# Patient Record
Sex: MALE | Race: BLACK OR AFRICAN AMERICAN | Employment: FULL TIME | URBAN - METROPOLITAN AREA
[De-identification: names, ages, dates, MRNs, and addresses within clinical notes are randomized per-mention and may not be internally consistent; named-entity substitution may affect disease eponyms.]

---

## 2020-10-12 ENCOUNTER — HOSPITAL ENCOUNTER (EMERGENCY)
Age: 60
Discharge: HOME | End: 2020-10-12
Attending: EMERGENCY MEDICINE
Payer: COMMERCIAL

## 2020-10-12 ENCOUNTER — APPOINTMENT (OUTPATIENT)
Dept: GENERAL RADIOLOGY | Age: 60
End: 2020-10-12
Attending: EMERGENCY MEDICINE
Payer: COMMERCIAL

## 2020-10-12 VITALS
HEIGHT: 64 IN | BODY MASS INDEX: 34.49 KG/M2 | DIASTOLIC BLOOD PRESSURE: 85 MMHG | HEART RATE: 86 BPM | TEMPERATURE: 98.2 F | OXYGEN SATURATION: 97 % | RESPIRATION RATE: 16 BRPM | WEIGHT: 202 LBS | SYSTOLIC BLOOD PRESSURE: 129 MMHG

## 2020-10-12 DIAGNOSIS — M19.011 OSTEOARTHRITIS OF RIGHT SHOULDER, UNSPECIFIED OSTEOARTHRITIS TYPE: Primary | ICD-10-CM

## 2020-10-12 PROCEDURE — 73030 X-RAY EXAM OF SHOULDER: CPT

## 2020-10-12 PROCEDURE — 99282 EMERGENCY DEPT VISIT SF MDM: CPT

## 2020-10-12 NOTE — ED TRIAGE NOTES
Pt c/o R shoulder pain that increases with movement for \"several weeks\". No known injury. +CSMT. Denies any other c/o.

## 2020-10-12 NOTE — ED PROVIDER NOTES
EMERGENCY DEPARTMENT HISTORY AND PHYSICAL EXAM      Date: 10/12/2020  Patient Name: Lizzette Carrillo    History of Presenting Illness     Chief Complaint   Patient presents with    Shoulder Pain       History Provided By: Patient    HPI: Lizzette Carrillo, 64 y.o. male with a past medical history significant for  diabetes presents to the ED with cc of right shoulder pain for 2 weeks. Denies any known injury. Worsening symptoms with movement. No chest pain shortness of breath fever cough or cold symptoms. No weakness or numbness. Took ibuprofen once without relief of pain    There are no other complaints, changes, or physical findings at this time. PCP: Rubina Arizmendi MD    No current facility-administered medications on file prior to encounter. No current outpatient medications on file prior to encounter. Past History     Past Medical History:  No past medical history on file. Past Surgical History:  No past surgical history on file. Family History:  No family history on file. Social History:  Social History     Tobacco Use    Smoking status: Not on file   Substance Use Topics    Alcohol use: Not on file    Drug use: Not on file       Allergies:  No Known Allergies      Review of Systems   Review of Systems   Constitutional: Negative for fever. Respiratory: Negative for cough. Skin: Negative for rash. Neurological: Negative for weakness and numbness. Physical Exam   Physical Exam  Vitals signs and nursing note reviewed. Constitutional:       Appearance: Normal appearance. HENT:      Head: Normocephalic and atraumatic. Eyes:      General: No scleral icterus. Extraocular Movements: Extraocular movements intact. Pupils: Pupils are equal, round, and reactive to light. Neck:      Musculoskeletal: Normal range of motion. No neck rigidity. Cardiovascular:      Rate and Rhythm: Normal rate and regular rhythm. Pulses: Normal pulses.    Pulmonary:      Effort: Pulmonary effort is normal.   Musculoskeletal:         General: No swelling or deformity. Comments: Patient points to anterior right shoulder as site of pain. Fairly good range of motion but pt states this reproduces pain. Skin:     General: Skin is warm and dry. Capillary Refill: Capillary refill takes less than 2 seconds. Findings: No bruising or rash. Neurological:      Mental Status: He is alert. Comments: Normal gross motor and sensory throughout   Psychiatric:         Mood and Affect: Mood normal.         Behavior: Behavior normal.         Diagnostic Study Results     Labs -   No results found for this or any previous visit (from the past 12 hour(s)). Radiologic Studies -   XR SHOULDER RT AP/LAT MIN 2 V   Final Result   Impression: No acute fracture or dislocation. Degenerative changes of the   glenohumeral and AC joints. CT Results  (Last 48 hours)    None        CXR Results  (Last 48 hours)    None            Medical Decision Making   I am the first provider for this patient. I reviewed the vital signs, available nursing notes, past medical history, past surgical history, family history and social history. Vital Signs-Reviewed the patient's vital signs. No data found. Records Reviewed: Nursing Notes    Provider Notes (Medical Decision Making):       ED Course:   Initial assessment performed. The patients presenting problems have been discussed, and they are in agreement with the care plan formulated and outlined with them. I have encouraged them to ask questions as they arise throughout their visit. Imp: THERESE  Discussed range of motion exercises and symptomatic treatment with over-the-counter medications and anti-inflammatories. Follow-up with PCP    PLAN:  1. There are no discharge medications for this patient.     2.   Follow-up Information     Follow up With Specialties Details Why Contact Info        Your doctor for a follow-up appointment Return to ED if worse     Diagnosis     Clinical Impression:   1.  Osteoarthritis of right shoulder, unspecified osteoarthritis type

## 2020-10-13 ENCOUNTER — HOSPITAL ENCOUNTER (EMERGENCY)
Age: 60
Discharge: HOME | End: 2020-10-13
Attending: EMERGENCY MEDICINE
Payer: COMMERCIAL

## 2020-10-13 VITALS
RESPIRATION RATE: 17 BRPM | WEIGHT: 202 LBS | SYSTOLIC BLOOD PRESSURE: 154 MMHG | OXYGEN SATURATION: 97 % | TEMPERATURE: 98.7 F | DIASTOLIC BLOOD PRESSURE: 96 MMHG | BODY MASS INDEX: 31.71 KG/M2 | HEIGHT: 67 IN | HEART RATE: 88 BPM

## 2020-10-13 DIAGNOSIS — M19.011 OSTEOARTHRITIS OF RIGHT SHOULDER, UNSPECIFIED OSTEOARTHRITIS TYPE: Primary | ICD-10-CM

## 2020-10-13 PROCEDURE — 99283 EMERGENCY DEPT VISIT LOW MDM: CPT

## 2020-10-13 RX ORDER — BUPIVACAINE HYDROCHLORIDE 5 MG/ML
2 INJECTION, SOLUTION PERINEURAL ONCE
Status: DISCONTINUED | OUTPATIENT
Start: 2020-10-13 | End: 2020-10-13

## 2020-10-13 RX ORDER — METHYLPREDNISOLONE ACETATE 40 MG/ML
40 INJECTION, SUSPENSION INTRA-ARTICULAR; INTRALESIONAL; INTRAMUSCULAR; SOFT TISSUE ONCE
Status: DISCONTINUED | OUTPATIENT
Start: 2020-10-13 | End: 2020-10-13 | Stop reason: HOSPADM

## 2020-10-13 RX ORDER — METFORMIN HYDROCHLORIDE 1000 MG/1
1000 TABLET ORAL 2 TIMES DAILY WITH MEALS
COMMUNITY

## 2020-10-13 RX ORDER — HYDROCODONE BITARTRATE AND ACETAMINOPHEN 5; 325 MG/1; MG/1
1 TABLET ORAL
Qty: 8 TAB | Refills: 0 | Status: SHIPPED | OUTPATIENT
Start: 2020-10-13 | End: 2020-10-16

## 2020-10-13 RX ORDER — LIDOCAINE HYDROCHLORIDE 10 MG/ML
5 INJECTION INFILTRATION; PERINEURAL ONCE
Status: DISCONTINUED | OUTPATIENT
Start: 2020-10-13 | End: 2020-10-13 | Stop reason: HOSPADM

## 2020-10-13 NOTE — ED PROVIDER NOTES
EMERGENCY DEPARTMENT HISTORY AND PHYSICAL EXAM      Date: 10/13/2020  Patient Name: Santiago Awan    History of Presenting Illness     Chief Complaint   Patient presents with    Shoulder Pain       History Provided By: Patient    HPI: Santiago Awan, 64 y.o. male with a past medical history significant No significant past medical history presents to the ED with cc of right shoulder pain. Patient seen last night at [facility] emergency department for shoulder pain due to osteoarthritis visit reviewed recommended at that time the patient received a steroid injection he deferred and now presents the ER requesting a steroid injection there is been no intercurrent trauma or new symptoms denies fever swelling injury. There are no other complaints, changes, or physical findings at this time. PCP: Isael Ortega MD    No current facility-administered medications on file prior to encounter. Current Outpatient Medications on File Prior to Encounter   Medication Sig Dispense Refill    metFORMIN (GLUCOPHAGE) 1,000 mg tablet Take 1,000 mg by mouth two (2) times daily (with meals). Past History     Past Medical History:  Past Medical History:   Diagnosis Date    Diabetes ([address]     Hyperlipemia     Hypertension        Past Surgical History:  History reviewed. No pertinent surgical history. Family History:  History reviewed. No pertinent family history. Social History:  Social History     Tobacco Use    Smoking status: Never Smoker    Smokeless tobacco: Never Used   Substance Use Topics    Alcohol use: Never     Frequency: Never    Drug use: Never       Allergies:  No Known Allergies      Review of Systems     Review of Systems   All other systems reviewed and are negative. Physical Exam   Middle-age male and mildly uncomfortable no acute distress  Physical Exam  Vitals signs and nursing note reviewed. Constitutional:       Appearance: Normal appearance.    Musculoskeletal: General: Tenderness present. No swelling, deformity or signs of injury. Comments: Right shoulder is mildly tender anteriorly and the AC area crepitus is palpable near full range of motion with pain the arm is otherwise neurovascular intact   Neurological:      Mental Status: He is alert. Diagnostic Study Results     Labs -   No results found for this or any previous visit (from the past 12 hour(s)). Radiologic Studies -   @lastxrresult@  CT Results  (Last 48 hours)    None        CXR Results  (Last 48 hours)    None            Medical Decision Making   I am the first provider for this patient. I reviewed the vital signs, available nursing notes, past medical history, past surgical history, family history and social history. Vital Signs-Reviewed the patient's vital signs. Patient Vitals for the past 12 hrs:   Temp Pulse Resp BP SpO2   10/13/20 1845 98.7 °F (37.1 °C) 91 17 (!) 160/100 97 %       Records Reviewed: Nursing Notes    The patient presents with shoulder pain with a differential diagnosis of arthritis fracture tumor bursitis tendinitis      Provider Notes (Medical Decision Making):   24-year-old male returns the emergency department after visit yesterday requesting a steroid injection of his right shoulder. ED visit from yesterday reviewed and x-ray report showing arthritic change exam firms this problem. [facility]         ED Course:   Initial assessment performed. The patients presenting problems have been discussed, and they are in agreement with the care plan formulated and outlined with them. I have encouraged them to ask questions as they arise throughout their visit. PROCEDURES  Procedures   Arthrocentesis: The right shoulder was cleansed with Betadine/alcohol. Using sterile technique #27 needle 40 mg of Depo-Medrol mixed with 2 cc of lidocaine l was introduced into the joint. Tolerated well    PLAN:  1. Current Discharge Medication List        2.    Follow-up Information    None       Return to ED if worse     Diagnosis     Clinical Impression: Right shoulder DJD steroid injection

## 2020-10-13 NOTE — ED TRIAGE NOTES
Pt stated he was seen at [facility] ED yesterday for same complaint of right sided shoulder pain. Pt states they wanted to administer a steroid shot but he refused. Pt states the pain started 2 weeks ago and radiates to his hand.

## 2020-10-14 NOTE — ED NOTES
Pt d/c by this RN, all questions and concerns addressed. Pt verbalized understanding of d/c and f/u instructions. Ambulatory with steady gait upon d/c.

## 2020-10-15 ENCOUNTER — HOSPITAL ENCOUNTER (EMERGENCY)
Age: 60
Discharge: HOME | End: 2020-10-15
Attending: EMERGENCY MEDICINE
Payer: COMMERCIAL

## 2020-10-15 VITALS
RESPIRATION RATE: 17 BRPM | HEART RATE: 87 BPM | TEMPERATURE: 98.9 F | DIASTOLIC BLOOD PRESSURE: 112 MMHG | OXYGEN SATURATION: 99 % | SYSTOLIC BLOOD PRESSURE: 174 MMHG

## 2020-10-15 DIAGNOSIS — M54.12 CERVICAL RADICULAR PAIN: Primary | ICD-10-CM

## 2020-10-15 DIAGNOSIS — M75.41 SHOULDER IMPINGEMENT SYNDROME, RIGHT: ICD-10-CM

## 2020-10-15 PROCEDURE — 99282 EMERGENCY DEPT VISIT SF MDM: CPT

## 2020-10-15 RX ORDER — PREDNISONE 20 MG/1
40 TABLET ORAL DAILY
Qty: 10 TAB | Refills: 0 | Status: SHIPPED | OUTPATIENT
Start: 2020-10-15 | End: 2020-10-20

## 2020-10-15 NOTE — ED PROVIDER NOTES
HPI   Patient returns to the ER with continued right shoulder and hand pain. This is his third ER visit in the last 48 hours for this complaint. Patient reports that the pain began about 1 week ago gradually. It is reported as sharp and stabbing and severe to the point where patient reports he cannot work or extend or abduct the shoulder without severe pain. Patient also reports paresthesias in the right hand centered in the first 3 fingers. He denies injury or trauma, denies focal weakness, denies fevers and constitutional symptoms. Obtained during a recent visit that apparently revealed osteoarthritis. Patient has been prescribed opiates, OTC analgesic, and direct injection of steroids into the joint all without improvement. Past Medical History:   Diagnosis Date    Diabetes ([facility] [address]     Hyperlipemia     Hypertension        History reviewed. No pertinent surgical history. History reviewed. No pertinent family history.     Social History     Socioeconomic History    Marital status:      Spouse name: Not on file    Number of children: Not on file    Years of education: Not on file    Highest education level: Not on file   Occupational History    Not on file   Social Needs    Financial resource strain: Not on file    Food insecurity     Worry: Not on file     Inability: Not on file    Transportation needs     Medical: Not on file     Non-medical: Not on file   Tobacco Use    Smoking status: Never Smoker    Smokeless tobacco: Never Used   Substance and Sexual Activity    Alcohol use: Never     Frequency: Never    Drug use: Never    Sexual activity: Not Currently   Lifestyle    Physical activity     Days per week: Not on file     Minutes per session: Not on file    Stress: Not on file   Relationships    Social connections     Talks on phone: Not on file     Gets together: Not on file     Attends [employer] service: Not on file     Active member of club or organization: Not on file Attends meetings of clubs or organizations: Not on file     Relationship status: Not on file    Intimate partner violence     Fear of current or ex partner: Not on file     Emotionally abused: Not on file     Physically abused: Not on file     Forced sexual activity: Not on file   Other Topics Concern    Not on file   Social History Narrative    Not on file         ALLERGIES: Patient has no known allergies. Review of Systems   Constitutional: Negative. HENT: Negative. Eyes: Negative. Respiratory: Negative. Cardiovascular: Negative. Gastrointestinal: Negative. Endocrine: Negative. Genitourinary: Negative. Musculoskeletal: Positive for arthralgias. Allergic/Immunologic: Negative. Neurological: Positive for numbness. Hematological: Negative. Psychiatric/Behavioral: Negative. All other systems reviewed and are negative. There were no vitals filed for this visit. Physical Exam  Vitals signs and nursing note reviewed. Constitutional:       General: He is not in acute distress. Appearance: Normal appearance. He is normal weight. He is not toxic-appearing. HENT:      Head: Normocephalic. Mouth/Throat:      Mouth: Mucous membranes are moist.   Eyes:      Extraocular Movements: Extraocular movements intact. Pupils: Pupils are equal, round, and reactive to light. Pulmonary:      Effort: Pulmonary effort is normal. No respiratory distress. Musculoskeletal: Normal range of motion. General: Tenderness present. No swelling, deformity or signs of injury. Comments: There is some mild tenderness to palpation in the right shoulder particularly the [facility] joint. There is full range of motion. No sign of trauma, injury, cellulitis   Skin:     General: Skin is warm and dry. Neurological:      General: No focal deficit present. Mental Status: He is alert and oriented to person, place, and time. Motor: No weakness.       Coordination: Coordination normal.      Comments: No weakness in the right upper extremity or hand. Psychiatric:      Comments: Quite distressed regarding his shoulder pain          MDM     Evaluation again reveals spontaneous right shoulder pain. The numbness and paresthesias in the right 3 fingers suggests a medial nerve radiculopathy. Explained to patient that this could be a cervical radiculopathy, a rotator cuff or shoulder impingement syndrome, or even a malignancy, and that further evaluation by MRI and neurology would be recommended. Patient reports that he will follow-up with his primary care doctor today.   Will provide a short course of steroids    Procedures

## 2020-10-15 NOTE — DISCHARGE INSTRUCTIONS
As we discussed, your pain could be due to a cervical spine nerve root issue, a rotator cuff issue, or even a malignancy. These all need follow-up with your primary care physician who can obtain MRI, neurology referral, or other further care.

## 2020-10-15 NOTE — ED TRIAGE NOTES
Pt seen this week in ED for same complaint. Pt c/o right shoulder pain that is unrelieved by OTC medications.  Pt states he has had this pain for a few weeks

## 2020-10-20 DIAGNOSIS — M25.511 RIGHT SHOULDER PAIN, UNSPECIFIED CHRONICITY: Primary | ICD-10-CM

## 2020-10-21 ENCOUNTER — OFFICE VISIT (OUTPATIENT)
Dept: ORTHOPEDIC SURGERY | Age: 60
End: 2020-10-21
Payer: COMMERCIAL

## 2020-10-21 VITALS — HEIGHT: 64 IN | WEIGHT: 202 LBS | BODY MASS INDEX: 34.49 KG/M2

## 2020-10-21 DIAGNOSIS — G56.01 CARPAL TUNNEL SYNDROME ON RIGHT: Primary | ICD-10-CM

## 2020-10-21 PROCEDURE — 99203 OFFICE O/P NEW LOW 30 MIN: CPT | Performed by: ORTHOPAEDIC SURGERY

## 2020-10-21 PROCEDURE — L3908 WHO COCK-UP NONMOLDE PRE OTS: HCPCS | Performed by: ORTHOPAEDIC SURGERY

## 2020-10-21 RX ORDER — GLIMEPIRIDE 4 MG/1
TABLET ORAL
COMMUNITY
Start: 2020-09-21

## 2020-10-21 RX ORDER — LOVASTATIN 20 MG/1
TABLET ORAL
COMMUNITY
Start: 2020-10-10

## 2020-10-21 RX ORDER — IBUPROFEN 800 MG/1
TABLET ORAL
COMMUNITY
Start: 2020-10-15

## 2020-10-21 RX ORDER — AMLODIPINE BESYLATE 5 MG/1
TABLET ORAL
COMMUNITY
Start: 2020-10-09

## 2020-10-21 RX ORDER — OXYCODONE AND ACETAMINOPHEN 7.5; 325 MG/1; MG/1
TABLET ORAL
COMMUNITY
Start: 2020-10-15

## 2020-10-21 NOTE — PATIENT INSTRUCTIONS

## 2020-10-21 NOTE — PROGRESS NOTES
Name: Emilee Galeas    : 1960     Service Dept: [address] [place] [address] and Sports Medicine    Patient's Pharmacies:    [facility] [address] [facility] [place] [facility], [address]  [address] [id number]  Phone: [phone] Fax: [phone]       Chief Complaint   Patient presents with    Hand Pain        Visit Vitals  Ht 5' 4\" (1.626 m)   Wt 202 lb (91.6 kg)   BMI 34.67 kg/m²        No Known Allergies     Current Outpatient Medications   Medication Sig Dispense Refill    amLODIPine (NORVASC) 5 mg tablet TAKE 1 TABLET BY MOUTH ONCE DAILY      glimepiride (AMARYL) 4 mg tablet TAKE 1 2 (ONE HALF) TABLET BY MOUTH ONCE DAILY      ibuprofen (MOTRIN) 800 mg tablet TAKE 1 TABLET BY MOUTH EVERY 8 HOURS WITH FOOD AS NEEDED FOR PAIN      lovastatin (MEVACOR) 20 mg tablet TAKE 2 TABLETS BY MOUTH ONCE DAILY      oxyCODONE-acetaminophen (PERCOCET 7.5) 7.5-325 mg per tablet TAKE 1 TABLET BY MOUTH EVERY 6 HOURS AS NEEDED FOR MODERATE TO SEVERE PAIN      metFORMIN (GLUCOPHAGE) 1,000 mg tablet Take 1,000 mg by mouth two (2) times daily (with meals). There is no problem list on file for this patient. Family History   Problem Relation Age of Onset    No Known Problems Mother     No Known Problems Father         Social History     Socioeconomic History    Marital status:      Spouse name: Not on file    Number of children: Not on file    Years of education: Not on file    Highest education level: Not on file   Tobacco Use    Smoking status: Never Smoker    Smokeless tobacco: Never Used   Substance and Sexual Activity    Alcohol use: Never     Frequency: Never    Drug use: Never    Sexual activity: Not Currently        History reviewed. No pertinent surgical history. Past Medical History:   Diagnosis Date    Diabetes ([address]     Hyperlipemia     Hypertension         I have reviewed and agree with PFSH and ROS and intake form in chart and the record.      Review of Systems:   Patient is a pleasant appearing individual, appropriately dressed, well hydrated, well nourished, who is alert, appropriately oriented for age, and in no acute distress with a normal gait and normal affect who does not appear to be in any significant pain. Physical Exam:  Right Wrist - Positive Carpal Compression test, Full Range of motion of the wrist, No Instability, Positive for numbness, Mild swelling, Decreased  strength, Positive for muscle atrophy, Good cap refill. Left Wrist - Negative for Carpal Compression test, Ful Range of motion of the wrist, No Instability, No Numbness, No Swelling, No Weakness, No Muscle atrophy, Good cap refill. Encounter Diagnoses     ICD-10-CM ICD-9-CM   1. Carpal tunnel syndrome on right  G56.01 354.0          HPI:  The patient is here with a chief complaint of right upper extremity numbness and tingling; shoulder pain, throbbing, burning pain. Post cortisone injection for the shoulder which helped, but using it still makes it worse. Pain is 8/10. ROS:  10-point review of systems is positive for nighttime pain. X-rays of the right shoulder done at an outside [facility] facility are unremarkable. Assessment/Plan:  1. Right upper extremity probable carpal tunnel syndrome. 2. Shoulder pain is better. My recommendation would be for wrist splint and EMG of right upper extremity. I will see the patient post EMG and go from there. Return to Office: Follow-up and Dispositions    · Return for POST EMG. Scribed by Shamar Stratton as dictated by Lilian Deluca. Ulices Charles MD.    Documentation True and Accepted Alexis Charles MD